# Patient Record
Sex: FEMALE | Race: WHITE | NOT HISPANIC OR LATINO | Employment: FULL TIME | URBAN - METROPOLITAN AREA
[De-identification: names, ages, dates, MRNs, and addresses within clinical notes are randomized per-mention and may not be internally consistent; named-entity substitution may affect disease eponyms.]

---

## 2022-10-14 RX ORDER — MELOXICAM 15 MG/1
15 TABLET ORAL DAILY
COMMUNITY

## 2022-10-14 RX ORDER — BUPROPION HYDROCHLORIDE 300 MG/1
300 TABLET ORAL DAILY
COMMUNITY

## 2022-10-14 RX ORDER — DESOGESTREL AND ETHINYL ESTRADIOL 0.15-0.03
1 KIT ORAL DAILY
COMMUNITY
End: 2022-11-16

## 2022-10-14 RX ORDER — LANOLIN ALCOHOL/MO/W.PET/CERES
1000 CREAM (GRAM) TOPICAL DAILY
COMMUNITY

## 2022-10-14 RX ORDER — FLUOXETINE HYDROCHLORIDE 40 MG/1
40 CAPSULE ORAL DAILY
COMMUNITY

## 2022-10-26 ENCOUNTER — DOCUMENTATION (OUTPATIENT)
Dept: BARIATRICS/WEIGHT MGMT | Facility: CLINIC | Age: 32
End: 2022-10-26

## 2022-10-26 ENCOUNTER — OFFICE VISIT (OUTPATIENT)
Dept: BEHAVIORAL HEALTH | Facility: CLINIC | Age: 32
End: 2022-10-26

## 2022-10-26 ENCOUNTER — OFFICE VISIT (OUTPATIENT)
Dept: BARIATRICS/WEIGHT MGMT | Facility: CLINIC | Age: 32
End: 2022-10-26

## 2022-10-26 VITALS
HEIGHT: 64 IN | TEMPERATURE: 97 F | OXYGEN SATURATION: 98 % | RESPIRATION RATE: 16 BRPM | WEIGHT: 293 LBS | HEART RATE: 81 BPM | DIASTOLIC BLOOD PRESSURE: 84 MMHG | SYSTOLIC BLOOD PRESSURE: 128 MMHG | BODY MASS INDEX: 50.02 KG/M2

## 2022-10-26 DIAGNOSIS — R12 HEARTBURN: ICD-10-CM

## 2022-10-26 DIAGNOSIS — E66.01 MORBID OBESITY WITH BMI OF 50.0-59.9, ADULT: ICD-10-CM

## 2022-10-26 DIAGNOSIS — I10 HYPERTENSION, UNSPECIFIED TYPE: ICD-10-CM

## 2022-10-26 DIAGNOSIS — Z71.89 ENCOUNTER FOR PSYCHOLOGICAL ASSESSMENT PRIOR TO BARIATRIC SURGERY: ICD-10-CM

## 2022-10-26 DIAGNOSIS — R06.09 DYSPNEA ON EXERTION: ICD-10-CM

## 2022-10-26 DIAGNOSIS — Z86.19 HISTORY OF HELICOBACTER PYLORI INFECTION: Primary | ICD-10-CM

## 2022-10-26 DIAGNOSIS — R53.83 FATIGUE, UNSPECIFIED TYPE: ICD-10-CM

## 2022-10-26 DIAGNOSIS — E66.01 MORBID OBESITY WITH BMI OF 50.0-59.9, ADULT: Primary | ICD-10-CM

## 2022-10-26 DIAGNOSIS — R60.0 LEG EDEMA: ICD-10-CM

## 2022-10-26 DIAGNOSIS — F32.9 CURRENT EPISODE OF MAJOR DEPRESSIVE DISORDER WITHOUT PRIOR EPISODE, UNSPECIFIED DEPRESSION EPISODE SEVERITY: ICD-10-CM

## 2022-10-26 PROCEDURE — 99204 OFFICE O/P NEW MOD 45 MIN: CPT | Performed by: PHYSICIAN ASSISTANT

## 2022-10-26 PROCEDURE — 90791 PSYCH DIAGNOSTIC EVALUATION: CPT | Performed by: PSYCHOLOGIST

## 2022-10-26 RX ORDER — LISINOPRIL 5 MG/1
5 TABLET ORAL DAILY
COMMUNITY
Start: 2022-09-19

## 2022-10-26 NOTE — PROGRESS NOTES
"[facility] BARIATRIC SURGERY  [address]  [address]  [facility] [address]  [phone]      Patient  Name:  Faina Valles  :  1990      Date of Visit: 10/26/2022      Chief Complaint:  weight gain; unable to maintain weight loss      History of Present Illness:  Faina Valles is a 32 y.o. female who presents today for evaluation, education and consultation regarding metabolic and bariatric surgery with Dr. Mahmood.     Faina has been overweight for at least 20 years, has been 35 pounds or more overweight for at least 15 years, has been 100 pounds or more overweight for 10 or more years and started dieting at age 14.  Previous diet attempts include: Low Carbohydrate, Calorie Counting and Fasting; Weight Watchers; Ionamin/Adipex.  The most weight Faina lost was 45 pounds but was unable to maintain that weight loss.  Her maximum lifetime weight is 331 pounds.      GI: Chronic, episodic heartburn.  Takes over-the-counter antacids as needed.  Reports EGD in  at [facility].  She has had a history of H. pylori detected in , underwent treatment, and had a negative retest.  She had a laparoscopic cholecystectomy in  at [facility] due to cholelithiasis and had acute pancreatitis prior to her surgery.  She has fatty liver disease and has been evaluated by gastroenterology.    Other past medical history includes hypertension, paresthesias in bilateral hands undergoing current work-up, joint pain (takes Mobic daily), anxiety/depression, and PCOS.     Complete history has been obtained and discussed today, as pertinent to metabolic/ bariatric surgery.     Past Medical History:   Diagnosis Date   • Anxiety    • Arthritis    • Asthma     \"childhood\" no inhaler use   • Depression    • Fatty liver disease, nonalcoholic    • GERD (gastroesophageal reflux disease)    • Glaucoma    • Heartburn     episodic; will take antacids; EGD in  [facility] " hospital;   • History of Helicobacter pylori infection     treated; 2021; retest negative   • Hx of Acute pancreatitis     due to gallstones   • Hypertension    • IBS (irritable bowel syndrome)    • Joint pain     feet/hands; works as hairdresser; Mobic daily   • Leg edema    • Neuromuscular disorder (HCC)    • Paresthesias     b/l hands   • PCOS (polycystic ovarian syndrome)      Past Surgical History:   Procedure Laterality Date   • COLONOSCOPY  2021   • LAPAROSCOPIC CHOLECYSTECTOMY  2021    [facility]; cholelithiasis   • TONSILLECTOMY  2004       Allergies   Allergen Reactions   • Cipro [Ciprofloxacin Hcl] Rash       Current Outpatient Medications:   •  buPROPion XL (WELLBUTRIN XL) 300 MG 24 hr tablet, Take 1 tablet by mouth Daily., Disp: , Rfl:   •  desogestrel-ethinyl estradiol (APRI) 0.15-30 MG-MCG per tablet, Take 1 tablet by mouth Daily., Disp: , Rfl:   •  FLUoxetine (PROzac) 40 MG capsule, Take 1 capsule by mouth Daily., Disp: , Rfl:   •  lisinopril (PRINIVIL,ZESTRIL) 5 MG tablet, Take 1 tablet by mouth Daily., Disp: , Rfl:   •  meloxicam (MOBIC) 15 MG tablet, Take 1 tablet by mouth Daily., Disp: , Rfl:   •  vitamin B-12 (CYANOCOBALAMIN) 1000 MCG tablet, Take 1 tablet by mouth Daily., Disp: , Rfl:     Social History     Socioeconomic History   • Marital status: Single   Tobacco Use   • Smoking status: Never   • Smokeless tobacco: Never   Vaping Use   • Vaping Use: Never used   Substance and Sexual Activity   • Alcohol use: Never   • Drug use: Never   • Sexual activity: Yes     Partners: Male     Birth control/protection: Pill, Same-sex partner     Social History     Social History Narrative    Pt lives in [facility], she is single with no children. She currently works full time at [employer].        Family History   Problem Relation Age of Onset   • Obesity Mother    • Diabetes Mother    • Hypertension Mother    • Sleep apnea Mother    • Arthritis Mother    • Asthma Mother    • COPD  Mother    • Obesity Father    • Diabetes Father    • Hypertension Father    • Stroke Father 50   • Heart attack Father 36   • Heart disease Father    • Sleep apnea Father    • Asthma Father    • COPD Father    • Drug abuse Father    • Obesity Sister    • Hypertension Brother    • Drug abuse Brother    • Obesity Maternal Grandmother    • Diabetes Maternal Grandmother    • Hypertension Maternal Grandmother    • Hypertension Maternal Grandfather    • Stroke Maternal Grandfather 61   • Throat cancer Maternal Grandfather 54   • Cancer Maternal Grandfather    • Obesity Paternal Grandmother    • Diabetes Paternal Grandmother    • Hypertension Paternal Grandmother    • Breast cancer Paternal Grandmother 63   • Cancer Paternal Grandmother    • Miscarriages / Stillbirths Maternal Aunt        Review of Systems:  Constitutional:  reports fatigue, weight gain and denies fevers, chills.  HEENT:  denies headache, ear pain or loss of hearing, blurred or double vision, nasal discharge or sore throat.  Cardiovascular:  reports HTN and denies HLD, CAD, Atrial Fib, hx heart disease, heart murmur, hx MI, chest pain, palpitations, edema, hx DVT.  Respiratory:  reports asthma and denies dyspnea on exertion, shortness of breath , cough , wheezing, sleep apnea, COPD, hx PE.  Gastrointestinal:  reports heartburn, IBS, gallbladder issues, hx pancreatitis and denies dysphagia, nausea, vomiting, diarrhea, constipation, melena, blood in stool.  Genitourinary:  reports none and denies history of  frequent UTI, incontinence, hematuria, dysuria, polyuria, polydipsia, renal insufficiency, renal failure.    Musculoskeletal:  reports joint pain and denies fibromyalgia, arthritis and autoimmune disease.  Neurological:  reports numbness /tingling and denies headaches, migraines, dizziness, confusion, seizure, stroke.  Psychiatric:  reports hx depression, hx anxiety and denies depressed mood, feeling anxious, bipolar disorder, suicidal ideation, hx  suicide attempt, hx self injury, hx substance abuse, eating disorder.  Endocrine:  reports PCOS and denies endometriosis, glucose intolerance, diabetes, thyroid disease, gout.  Hematologic:  reports none and denies bruising, bleeding disorder, hx anemia, hx blood transfusion.  Skin:  reports none and denies rashes, hx MRSA.    Physical Exam:  Vital Signs:  Weight: 134 kg (294 lb 8 oz)   Body mass index is 51.35 kg/m².  Temp: 97 °F (36.1 °C)   Heart Rate: 81   BP: 128/84     Physical Exam  Constitutional:       Appearance: She is obese.   HENT:      Head: Normocephalic and atraumatic.   Eyes:      Extraocular Movements: Extraocular movements intact.      Conjunctiva/sclera: Conjunctivae normal.   Cardiovascular:      Rate and Rhythm: Normal rate and regular rhythm.   Pulmonary:      Effort: Pulmonary effort is normal.      Breath sounds: Normal breath sounds.   Abdominal:      General: Bowel sounds are normal. There is no distension.      Palpations: Abdomen is soft. There is no mass.      Tenderness: There is no abdominal tenderness.      Comments: Old lap scars   Musculoskeletal:         General: Normal range of motion.      Cervical back: Normal range of motion and neck supple.   Skin:     General: Skin is warm and dry.   Neurological:      General: No focal deficit present.      Mental Status: She is alert and oriented to person, place, and time.   Psychiatric:         Mood and Affect: Mood normal.         Behavior: Behavior normal.         Thought Content: Thought content normal.         Judgment: Judgment normal.         Patient Active Problem List   Diagnosis   • Hypertension   • Leg edema   • Heartburn   • History of Helicobacter pylori infection       Assessment:  32 y.o. female with medically complicated obesity pursuing sleeve gastrectomy.    Metabolic & Bariatric Surgery is deemed medically necessary given the following:  Class 3 Severe Obesity (BMI >=40). Obesity-related health conditions include the  following: hypertension, GERD and PCOS, Fatigue . Obesity is worsening. BMI is is above average; BMI management plan is completed. We discussed consulting a Bariatric surgeon.      Plan:  Further evaluation will include: CBC, CMP, Lipids, TSH, HgA1C, H.Pylori UBT, EKG, CXR and Pulmonary Function Testing.    Additional clearances needed prior to surgery will include: Cardiology.     Patient understands that bariatric surgery is not cosmetic surgery but rather a tool to help make a lifelong commitment to lifestyle changes including diet, exercise and behavior modifications.  The patient has been educated today on those expected postoperative lifestyle changes.  Psychological and Nutritional consultations will be completed prior to surgery.  Instructions on how to access [employer] (an internet based site w/ educational surgical videos) were given to the patient.  Recommended perioperative vitamin supplementation was reviewed.  The importance of avoiding ASA/ NSAIDS/ steroids/ tobacco/nicotine/ hormones/ immunomodulators perioperatively was discussed in detail.  All questions/concerns have been addressed.      Further input to follow pending the above.           HIMA Donaldson

## 2022-10-26 NOTE — PROGRESS NOTES
Bariatric Nutrition Consult     Name: Faina Valles   : 1990   AGE: 32 y.o.   MRN: [id number]      Consult Date: 10/26/2022     Surgery desired: sleeve    Height: 161.3cm                  Current weight: 294lbs                    BMI: 51.35    Highest weight: 325lbs                           Lowest weight: 280lbs    Goals: 199lbs        No past medical history on file.                              Diet history reveals eating less recently due to a break up  24 hr recall:  Breakfast: banana and 16oz mt dew  Lunch: 1 little shawanda brownie, 16oz mt dew  Dinner: taco salad-doritos, meat, cheese, lettuce, sauce  Snacks: gummies (pack)    Protein sources: beef, cheese, eggs, shrimp, cooked chicken, pork    Drinks: reduced soda from 8 a day to 4 a day. Has omitted in the past and switched to water, willing to do this again      Food allergies/intolerances: ? Lactose intolerance, texture issues with milk/yogurt/creamy mushy foods    Night eating: no    Patient has/has not been diagnosed with an eating disorder: no    Exercise/activity: no    Main bariatric nutrition principles discussed and explained. Patient needs to focus on 100g protein daily, 100-140g carbohydrates daily, healthy fat intake, 64 oz fluid daily, no carbonation, and try protein drinks/protein powders. Avoid high fructose corn syrup. Patient verbalized understanding and queries were answered.  Additional nutritional counseling will be available      Ekta Acosta RD,BOB

## 2022-10-26 NOTE — PROGRESS NOTES
PROGRESS NOTE    Data:    Faina Delatorre is a 32 y.o. female who met with the undersigned for a scheduled psychological evaluation from 2:45 - 3:20pm.      Clinical Maneuvering/Intervention:      Chief complaint and history of presenting illness/Problems: struggling with obesity for several years. Despite trying different weight loss plans and diets, the pt reported being unsuccessful in losing weight. A psychological evaluation was conducted in order to assess past and current level of functioning. Areas assessed included, but were not limited to: perception of social support, perception of ability to face and deal with challenges in life (positive functioning), anxiety symptoms, depressive symptoms, perspective on beliefs/belief system, coping skills for stress, intelligence level, addiction issues, etc. Therapeutic rapport was established. Interventions conducted today were geared towards assessing the pt's readiness for weight loss surgery and identifying and psychological contraindications for undergoing such a major life change. Social support was deemed strong (specific to weight loss surgery/weight loss in this manner and in a general sense): mother, friends, co-workers, clients (she is a ). Current psychological struggles were described as low, but included: depression situational to being overweight. Coping skills for distress and related to undergoing a major life change such as weight loss surgery/weight loss were deemed strong and included asking for help, thoughtful, good sense of humor, follows directions well, responsible person, maintains quality relationships with others, and believes in herself that she will be successful with weight loss surgery. The pt endorsed having characteristics of readiness to undergo major life changes inherent in the journey of weight loss surgery. She could speak to having 'suffered enough,' and the decision to have weight loss surgery has 'clicked' for  her. The pt expressed gratitude for today's visit.     Past Family and Social History:      History of family mental health problems: none endorsed    Psychosocial history: treatment of psychiatric care in the past (N/A), alcohol/substance abuse treatment in the past (N/A) , alcohol/substance abuse problems (N/A), inpatient psychiatric care (N/A).    Mental Status Exam (MSE):  Hygiene:  good  Dress: normal  Attitude:  cooperative and proactive  Motor Activity: normal  Speech: normal  Mood:   nervous and excited  Affect:  congruent  Thought Processes: normal  Thought Content:  normal  Suicidal Thoughts:  not endorsed  Homicidal Thoughts:  not endorsed  Crisis Safety Plan: not needed   Hallucinations:  none      Patient's Support Network Includes:  family, friends      Progress toward goal: there is evidence to suggest that she is taking measures to improve the quality of her life including seeking weight loss surgery.       Functional Status: moderate to high      Prognosis: good with weight loss surgery    Evaluation, Diagnoses, and Ability/Capacity to Respond to Treatment:      The pt presented to be struggling with depression related to obesity (BMI = 51.35, morbid obesity) and depressed in life in general. Results of MSE demonstrated a functional status of moderate to high. Strengths: belief in self that she will be successful with weight loss surgery, etc (see detailed list of coping skills above). Needed for growth (CPT code requirement for Weaknesses): weight loss.      From a psychological standpoint, the pt presents as a good candidate for bariatric surgery. She is motivated for the surgery, has showed readiness for the lifestyle change in terms of starting to adjust her eating habits, and seems to have appropriate expectations of how to prepare and how to live after surgery in order to lose weight successfully.    Treatment Plan:      Short term goals: Start improving her health by following up with her  bariatric surgeon in order to receive weight loss surgery as soon as feasible/appropriate and demonstrate success with compliance to adhering to the recommended diet. Long term goals: reach a healthy weight and remittance of depression via taking control over her health.    Lidia Rea, PhD, LP

## 2022-10-27 LAB
ALBUMIN SERPL-MCNC: 4.1 G/DL (ref 3.9–5)
ALBUMIN/GLOB SERPL: 1.5 {RATIO} (ref 1.2–2.2)
ALP SERPL-CCNC: 75 IU/L (ref 44–121)
ALT SERPL-CCNC: 22 IU/L (ref 0–32)
AST SERPL-CCNC: 10 IU/L (ref 0–40)
BASOPHILS # BLD AUTO: 0.1 X10E3/UL (ref 0–0.2)
BASOPHILS NFR BLD AUTO: 1 %
BILIRUB SERPL-MCNC: 0.2 MG/DL (ref 0–1.2)
BUN SERPL-MCNC: 7 MG/DL (ref 6–20)
BUN/CREAT SERPL: 8 (ref 9–23)
CALCIUM SERPL-MCNC: 9.5 MG/DL (ref 8.7–10.2)
CHLORIDE SERPL-SCNC: 106 MMOL/L (ref 96–106)
CHOLEST SERPL-MCNC: 133 MG/DL (ref 100–199)
CO2 SERPL-SCNC: 22 MMOL/L (ref 20–29)
CREAT SERPL-MCNC: 0.83 MG/DL (ref 0.57–1)
EGFRCR SERPLBLD CKD-EPI 2021: 97 ML/MIN/1.73
EOSINOPHIL # BLD AUTO: 0 X10E3/UL (ref 0–0.4)
EOSINOPHIL NFR BLD AUTO: 1 %
ERYTHROCYTE [DISTWIDTH] IN BLOOD BY AUTOMATED COUNT: 13.1 % (ref 11.7–15.4)
GLOBULIN SER CALC-MCNC: 2.8 G/DL (ref 1.5–4.5)
GLUCOSE SERPL-MCNC: 84 MG/DL (ref 70–99)
HBA1C MFR BLD: 5.2 % (ref 4.8–5.6)
HCT VFR BLD AUTO: 40.8 % (ref 34–46.6)
HDLC SERPL-MCNC: 48 MG/DL
HGB BLD-MCNC: 13.3 G/DL (ref 11.1–15.9)
IMM GRANULOCYTES # BLD AUTO: 0 X10E3/UL (ref 0–0.1)
IMM GRANULOCYTES NFR BLD AUTO: 0 %
LDLC SERPL CALC-MCNC: 68 MG/DL (ref 0–99)
LYMPHOCYTES # BLD AUTO: 2.4 X10E3/UL (ref 0.7–3.1)
LYMPHOCYTES NFR BLD AUTO: 35 %
MCH RBC QN AUTO: 27.9 PG (ref 26.6–33)
MCHC RBC AUTO-ENTMCNC: 32.6 G/DL (ref 31.5–35.7)
MCV RBC AUTO: 86 FL (ref 79–97)
MONOCYTES # BLD AUTO: 0.4 X10E3/UL (ref 0.1–0.9)
MONOCYTES NFR BLD AUTO: 6 %
NEUTROPHILS # BLD AUTO: 3.9 X10E3/UL (ref 1.4–7)
NEUTROPHILS NFR BLD AUTO: 57 %
PLATELET # BLD AUTO: 343 X10E3/UL (ref 150–450)
POTASSIUM SERPL-SCNC: 4.4 MMOL/L (ref 3.5–5.2)
PROT SERPL-MCNC: 6.9 G/DL (ref 6–8.5)
RBC # BLD AUTO: 4.76 X10E6/UL (ref 3.77–5.28)
SODIUM SERPL-SCNC: 140 MMOL/L (ref 134–144)
TRIGL SERPL-MCNC: 87 MG/DL (ref 0–149)
TSH SERPL DL<=0.005 MIU/L-ACNC: 0.59 UIU/ML (ref 0.45–4.5)
UREA BREATH TEST QL: NEGATIVE
VLDLC SERPL CALC-MCNC: 17 MG/DL (ref 5–40)
WBC # BLD AUTO: 6.8 X10E3/UL (ref 3.4–10.8)

## 2022-11-09 NOTE — PROGRESS NOTES
[facility] Cardiology  [address], [address]  [place], [place], [id number] [phone]  [email or web address]           OUTPATIENT CLINIC CONSULTATION NOTE    Patient care team:  Patient Care Team:  Anabel Whitehead DO as PCP - General (Internal Medicine)    Requesting Provider and Reason for consultation: The patient is being seen today at the request of HIMA Donaldson for hypertension, preoperative cardiac risk assessment.     Subjective:   Chief complaint:   Chief Complaint   Patient presents with   • Hypertension   • Leg Swelling   • Surgical Clearance     Gastric sleeve       HPI:    Faina Valles is a 32 y.o. female.  Cardiac focused problem list:  1. Hypertension   2. Heartburn   3. History of H. Pylori infection   a. EGD 2021  b. Negative for H. Pylori status post treatment  4. Morbid obesity   5. PCOS  6. Anxiety   7. Depression     Patient presents today for consultation and preoperative cardiac risk assessment.  She is undergoing evaluation for bariatric surgery.  Denies tobacco, EtOH or drug use.  Does have a family history of coronary artery disease in her father who has had MIs x3 and cardiac stents.  His first MI was in his 30s and he is a heavy smoker, meth user and diabetic.    Patient has not had any prior cardiac testing.  No regular forms of exercise.  Has some dyspnea on exertion and mild lower extremity edema.  Denies chest pain, palpitations, lightheadedness or syncope.  Is able to perform household chores, grocery shopping and activities of daily living without difficulties.    Review of Systems:  As noted above in the HPI    PFSH:  Patient Active Problem List   Diagnosis   • Hypertension   • Leg edema   • Heartburn   • History of Helicobacter pylori infection         Current Outpatient Medications:   •  buPROPion XL (WELLBUTRIN XL) 300 MG 24 hr tablet, Take 1 tablet by mouth Daily., Disp: , Rfl:   •  FLUoxetine (PROzac) 40 MG capsule, Take 1 capsule by  mouth Daily., Disp: , Rfl:   •  lisinopril (PRINIVIL,ZESTRIL) 5 MG tablet, Take 1 tablet by mouth Daily., Disp: , Rfl:   •  meloxicam (MOBIC) 15 MG tablet, Take 1 tablet by mouth Daily., Disp: , Rfl:   •  Nextstellis 3-14.2 MG tablet, Take 1 tablet by mouth Daily., Disp: , Rfl:   •  vitamin B-12 (CYANOCOBALAMIN) 1000 MCG tablet, Take 1 tablet by mouth Daily., Disp: , Rfl:     Allergies   Allergen Reactions   • Cipro [Ciprofloxacin Hcl] Rash       Social History     Socioeconomic History   • Marital status: Single   Tobacco Use   • Smoking status: Never   • Smokeless tobacco: Never   Vaping Use   • Vaping Use: Never used   Substance and Sexual Activity   • Alcohol use: Never   • Drug use: Never   • Sexual activity: Yes     Partners: Male     Birth control/protection: Pill, Same-sex partner     Family History   Problem Relation Age of Onset   • Obesity Mother    • Diabetes Mother    • Hypertension Mother    • Sleep apnea Mother    • Arthritis Mother    • Asthma Mother    • COPD Mother    • Obesity Father    • Diabetes Father    • Hypertension Father    • Stroke Father 50   • Heart attack Father 36   • Heart disease Father    • Sleep apnea Father    • Asthma Father    • COPD Father    • Drug abuse Father    • Obesity Sister    • No Known Problems Sister    • Hypertension Brother    • Drug abuse Brother    • No Known Problems Brother    • Miscarriages / Stillbirths Maternal Aunt    • Obesity Maternal Grandmother    • Diabetes Maternal Grandmother    • Hypertension Maternal Grandmother    • Hypertension Maternal Grandfather    • Stroke Maternal Grandfather 61   • Throat cancer Maternal Grandfather 54   • Cancer Maternal Grandfather    • Obesity Paternal Grandmother    • Diabetes Paternal Grandmother    • Hypertension Paternal Grandmother    • Breast cancer Paternal Grandmother 63   • Cancer Paternal Grandmother          Objective:   Physical Exam:  /64 (BP Location: Right arm, Patient Position: Sitting)   Pulse 77  "  Ht 165.1 cm (65\")   Wt 132 kg (291 lb)   SpO2 98%   BMI 48.42 kg/m²   CONSTITUTIONAL: No acute distress  RESPIRATORY: Normal effort. Clear to auscultation bilaterally without wheezing or rales  CARDIOVASCULAR: Regular rate and rhythm with normal S1 and S2. Without murmur.  PERIPHERAL VASCULAR: Normal radial pulse. There is no lower extremity edema bilaterally.      Labs:  Labs reviewed by myself  BUN   Date Value Ref Range Status   10/26/2022 7 6 - 20 mg/dL Final     Creatinine   Date Value Ref Range Status   10/26/2022 0.83 0.57 - 1.00 mg/dL Final     Potassium   Date Value Ref Range Status   10/26/2022 4.4 3.5 - 5.2 mmol/L Final     ALT (SGPT)   Date Value Ref Range Status   10/26/2022 22 0 - 32 IU/L Final     AST (SGOT)   Date Value Ref Range Status   10/26/2022 10 0 - 40 IU/L Final       No results found for: CHOL  Lab Results   Component Value Date    TRIG 87 10/26/2022     Lab Results   Component Value Date    HDL 48 10/26/2022     Lab Results   Component Value Date    LDL 68 10/26/2022     No components found for: LDLDIRECTC    Diagnostic Data:      ECG 12 Lead    Date/Time: 11/16/2022 4:05 PM  Performed by: Baljeet Marquez MD  Authorized by: Baljeet Marquez MD   Comparison: not compared with previous ECG   Previous ECG: no previous ECG available  Rhythm: sinus rhythm  Rate: normal  BPM: 73  Conduction: conduction normal                  Assessment and Plan:     Primary hypertension  -Blood pressure stable.   -Continue current related medication.     Preoperative cardiovascular examination   -Does have some risk factors for cardiac disease.  However she is active without cardiac symptoms.  -Regularly performing greater than 4 metabolic equivalents without cardiopulmonary symptoms.  -Okay to proceed with bariatric surgery from a cardiac standpoint.  No additional cardiac testing recommended at this time.    - Return if symptoms worsen or fail to improve.    Scribed for Baljeet Marquez MD by Pepper Al, " APRN. 11/16/2022  16:05 EST    I, Baljeet Marquez MD, personally performed the services as scribed by the above named individual. I have made any necessary edits and it is both accurate and complete.     Baljeet Marquez MD, MSc, FACC, [facility]  Interventional Cardiology  [facility]

## 2022-11-16 ENCOUNTER — OFFICE VISIT (OUTPATIENT)
Dept: CARDIOLOGY | Facility: CLINIC | Age: 32
End: 2022-11-16

## 2022-11-16 VITALS
SYSTOLIC BLOOD PRESSURE: 112 MMHG | OXYGEN SATURATION: 98 % | WEIGHT: 291 LBS | BODY MASS INDEX: 48.48 KG/M2 | HEIGHT: 65 IN | DIASTOLIC BLOOD PRESSURE: 64 MMHG | HEART RATE: 77 BPM

## 2022-11-16 DIAGNOSIS — I10 PRIMARY HYPERTENSION: Primary | ICD-10-CM

## 2022-11-16 DIAGNOSIS — Z01.810 PREOPERATIVE CARDIOVASCULAR EXAMINATION: ICD-10-CM

## 2022-11-16 PROCEDURE — 93000 ELECTROCARDIOGRAM COMPLETE: CPT | Performed by: INTERNAL MEDICINE

## 2022-11-16 PROCEDURE — 99203 OFFICE O/P NEW LOW 30 MIN: CPT | Performed by: INTERNAL MEDICINE

## 2022-11-16 RX ORDER — DROSPIRENONE AND ESTETROL 3-14.2(28)
1 KIT ORAL DAILY
COMMUNITY
Start: 2022-11-02

## 2024-01-17 ENCOUNTER — TELEPHONE (OUTPATIENT)
Dept: CARDIOLOGY | Facility: CLINIC | Age: 34
End: 2024-01-17
Payer: COMMERCIAL

## 2024-01-17 ENCOUNTER — OFFICE VISIT (OUTPATIENT)
Dept: CARDIOLOGY | Facility: CLINIC | Age: 34
End: 2024-01-17
Payer: COMMERCIAL

## 2024-01-17 VITALS
WEIGHT: 293 LBS | HEART RATE: 85 BPM | DIASTOLIC BLOOD PRESSURE: 74 MMHG | OXYGEN SATURATION: 98 % | BODY MASS INDEX: 50.02 KG/M2 | HEIGHT: 64 IN | SYSTOLIC BLOOD PRESSURE: 142 MMHG

## 2024-01-17 DIAGNOSIS — Z01.818 PREOPERATIVE CLEARANCE: Primary | ICD-10-CM

## 2024-01-17 DIAGNOSIS — I10 PRIMARY HYPERTENSION: ICD-10-CM

## 2024-01-17 DIAGNOSIS — R01.1 CARDIAC MURMUR: ICD-10-CM

## 2024-01-17 NOTE — TELEPHONE ENCOUNTER
Received cardiac clearance request from  stating pt has weight loss surgery to be scheduled and is requiring a cardiac clearance. Placed cardiac clearance request in [facility]'s inbox to review and address with provider.

## 2024-01-17 NOTE — PROGRESS NOTES
"Ana Valles is a 33 y.o. female who presents today for Establish Care (Cardiac Eval. /Cardiac Clearance for bariatric surgery ).    CHIEF COMPLIANT  Chief Complaint   Patient presents with    Establish Care     Cardiac Eval.   Cardiac Clearance for bariatric surgery        Active Problems:  Hypertension  Clearance for bariatric surgery   3. Cardiac murmur     HPI  The patient is a 33 year old female that was referred to obtain cardiac clearance prior to bariatric surgery.  She will be undergoing gastric sleeve surgery in the next few months.  She denies chest pain, shortness of breath, syncope, near syncope, palpitations.  She is on a low dose of lisinopril due to hypertension.  She does have a family history of CAD, her father has a h/o MI and stenting in his late 30's to early 40s.  Her EKG shows NSR, no acute ST changes noted.  She does have a one year old and is able to do most activities without limitation.      PRIOR MEDS  Current Outpatient Medications on File Prior to Visit   Medication Sig Dispense Refill    buPROPion XL (WELLBUTRIN XL) 300 MG 24 hr tablet Take 1 tablet by mouth Daily.      cholecalciferol (VITAMIN D3) 1.25 MG (94783 UT) capsule Take 1 capsule every week by oral route.      lisinopril (PRINIVIL,ZESTRIL) 5 MG tablet Take 1 tablet by mouth Daily.      meloxicam (MOBIC) 15 MG tablet Take 1 tablet by mouth Daily.      Nextstellis 3-14.2 MG tablet Take 1 tablet by mouth Daily.      vitamin B-12 (CYANOCOBALAMIN) 1000 MCG tablet Take 1 tablet by mouth Daily.      [DISCONTINUED] FLUoxetine (PROzac) 40 MG capsule Take 1 capsule by mouth Daily.       No current facility-administered medications on file prior to visit.       ALLERGIES  Cipro [ciprofloxacin hcl]    HISTORY  Past Medical History:   Diagnosis Date    Anxiety     Arthritis     Asthma     \"childhood\" no inhaler use    Depression     Fatty liver disease, nonalcoholic     GERD (gastroesophageal reflux disease)     Glaucoma  "    Heartburn     episodic; will take antacids; EGD in 2021 [facility];    History of Helicobacter pylori infection     treated; 2021; retest negative    Hx of Acute pancreatitis     due to gallstones    Hypertension     IBS (irritable bowel syndrome)     Joint pain     feet/hands; works as hairdresser; Mobic daily    Leg edema     Neuromuscular disorder     Paresthesias     b/l hands    PCOS (polycystic ovarian syndrome)        Social History     Socioeconomic History    Marital status: Single   Tobacco Use    Smoking status: Never    Smokeless tobacco: Never   Vaping Use    Vaping Use: Never used   Substance and Sexual Activity    Alcohol use: Never    Drug use: Never    Sexual activity: Yes     Partners: Male     Birth control/protection: Birth control pill, Same-sex partner, Pill       Family History   Problem Relation Age of Onset    Obesity Mother     Diabetes Mother     Hypertension Mother     Sleep apnea Mother     Arthritis Mother     Asthma Mother     COPD Mother     Obesity Father     Diabetes Father     Hypertension Father     Stroke Father 50    Heart attack Father 36    Heart disease Father     Sleep apnea Father     Asthma Father     COPD Father     Drug abuse Father     Obesity Sister     No Known Problems Sister     Hypertension Brother     Drug abuse Brother     No Known Problems Brother     Miscarriages / Stillbirths Maternal Aunt     Obesity Maternal Grandmother     Diabetes Maternal Grandmother     Hypertension Maternal Grandmother     Hypertension Maternal Grandfather     Stroke Maternal Grandfather 61    Throat cancer Maternal Grandfather 54    Cancer Maternal Grandfather     Obesity Paternal Grandmother     Diabetes Paternal Grandmother     Hypertension Paternal Grandmother     Breast cancer Paternal Grandmother 63    Cancer Paternal Grandmother        Review of Systems   Constitutional:  Negative for chills, diaphoresis, fatigue (no more than normal) and fever.   HENT: Negative.    "  Eyes: Negative.  Negative for visual disturbance.   Respiratory: Negative.  Negative for apnea, cough, chest tightness, shortness of breath and wheezing.    Cardiovascular: Negative.  Negative for chest pain, palpitations and leg swelling.   Gastrointestinal: Negative.  Negative for blood in stool.   Endocrine: Negative.  Negative for cold intolerance and heat intolerance.   Genitourinary: Negative.  Negative for hematuria.   Musculoskeletal: Negative.  Negative for arthralgias, back pain, myalgias, neck pain and neck stiffness.   Skin: Negative.  Negative for color change, rash and wound.   Allergic/Immunologic: Negative.  Negative for environmental allergies and food allergies.   Neurological: Negative.  Negative for dizziness, syncope, weakness, light-headedness, numbness and headaches.   Hematological: Negative.  Does not bruise/bleed easily.   Psychiatric/Behavioral: Negative.  Negative for sleep disturbance.        Objective     VITALS: /74 (BP Location: Right arm, Patient Position: Sitting)   Pulse 85   Ht 162.6 cm (64\")   Wt (!) 148 kg (327 lb 3.2 oz)   SpO2 98%   BMI 56.16 kg/m²     LABS:   Lab Results (most recent)       None            IMAGING:   No Images in the past 120 days found..    EXAM:  Physical Exam  Constitutional:       Appearance: Normal appearance.   Eyes:      Pupils: Pupils are equal, round, and reactive to light.   Cardiovascular:      Rate and Rhythm: Normal rate and regular rhythm.      Pulses:           Carotid pulses are 2+ on the right side and 2+ on the left side.       Radial pulses are 2+ on the right side and 2+ on the left side.        Dorsalis pedis pulses are 2+ on the right side and 2+ on the left side.        Posterior tibial pulses are 2+ on the right side and 2+ on the left side.      Heart sounds: Murmur (1/6 systolic murmur noted) heard.   Pulmonary:      Effort: Pulmonary effort is normal.      Breath sounds: Normal breath sounds.   Abdominal:      General: " Bowel sounds are normal.      Palpations: Abdomen is soft.   Musculoskeletal:      Right lower leg: No edema.      Left lower leg: No edema.   Skin:     General: Skin is warm and dry.      Capillary Refill: Capillary refill takes less than 2 seconds.   Neurological:      General: No focal deficit present.      Mental Status: She is alert and oriented to person, place, and time.   Psychiatric:         Mood and Affect: Mood normal.         Thought Content: Thought content normal.         Procedure     ECG 12 Lead    Date/Time: 1/17/2024 10:41 AM  Performed by: Henrietta Navarrete APRN    Authorized by: Henrietta Navarrete APRN  Comparison: compared with previous ECG from 11/16/2022  Rhythm: sinus rhythm  Rate: normal  BPM: 76  Conduction: conduction normal  ST Segments: ST segments normal  T Waves: T waves normal  QRS axis: normal  Comments: No acute changes  Qtc 392ms              Assessment & Plan    Diagnosis Plan   1. Preoperative clearance        2. Cardiac murmur  ECG 12 Lead    Adult Transthoracic Echo Complete W/ Cont if Necessary Per Protocol      3. Primary hypertension          Preoperative clearance: The patient denies chest pain, shortness of breath, syncope, near syncope.  EKG shows no acute changes.    Cardiac murmur: The patient was found to have a murmur on assessment today.  Will obtain an echo for evaluate LV function and structural anatomy.    Primary hypertension: Continue lisinopril.  The patient was instructed to monitor BP at home and to notify our office if systolic BP is consistently greater than 130-135 systolic.  Goal BP is 130-135/70-80.  Return in about 2 months (around 3/17/2024).    Patient did not bring med list or medicine bottles to appointment, med list has been reviewed and updated based on patient's knowledge of their meds.          MEDS ORDERED DURING VISIT:  No orders of the defined types were placed in this encounter.      DISCONTINUED MEDS DURING VISIT:   Medications  Discontinued During This Encounter   Medication Reason    FLUoxetine (PROzac) 40 MG capsule Discontinued by another clinician          This document has been electronically signed by CHARISMA Joseph  January 17, 2024 11:12 EST    Dictated Utilizing Dragon Dictation: Part of this note may be an electronic transcription/translation of spoken language to printed text using the Dragon Dictation System

## 2024-01-18 ENCOUNTER — PATIENT ROUNDING (BHMG ONLY) (OUTPATIENT)
Dept: CARDIOLOGY | Facility: CLINIC | Age: 34
End: 2024-01-18
Payer: COMMERCIAL

## 2024-01-18 NOTE — PROGRESS NOTES
January 18, 2024    Hello, may I speak with Faina Valles?    My name is ARMANDO LESLIE      I am  with [facility]  [address] [place] [id number] [phone].    Before we get started may I verify your date of birth? 1990    I am calling to officially welcome you to our practice and ask about your recent visit. Is this a good time to talk? yes    Tell me about your visit with us. What things went well?  EVERYTHING WENT FINE.  SHE THINKS I HAVE A MURMUR       We're always looking for ways to make our patients' experiences even better. Do you have recommendations on ways we may improve?  no    Overall were you satisfied with your first visit to our practice? yes       I appreciate you taking the time to speak with me today. Is there anything else I can do for you? no      Thank you, and have a great day.

## 2024-02-16 ENCOUNTER — HOSPITAL ENCOUNTER (OUTPATIENT)
Dept: CARDIOLOGY | Facility: HOSPITAL | Age: 34
Discharge: HOME | End: 2024-02-16
Payer: COMMERCIAL

## 2024-02-16 DIAGNOSIS — R01.1 CARDIAC MURMUR: ICD-10-CM

## 2024-02-16 LAB
BH CV ECHO MEAS - ACS: 1.82 CM
BH CV ECHO MEAS - AO MAX PG: 11.7 MMHG
BH CV ECHO MEAS - AO MEAN PG: 6.6 MMHG
BH CV ECHO MEAS - AO ROOT DIAM: 2.47 CM
BH CV ECHO MEAS - AO V2 MAX: 171 CM/SEC
BH CV ECHO MEAS - AO V2 VTI: 32.1 CM
BH CV ECHO MEAS - EDV(CUBED): 65 ML
BH CV ECHO MEAS - EDV(MOD-SP4): 125 ML
BH CV ECHO MEAS - EF(MOD-SP4): 57.8 %
BH CV ECHO MEAS - EF_3D-VOL: 60 %
BH CV ECHO MEAS - ESV(CUBED): 9 ML
BH CV ECHO MEAS - ESV(MOD-SP4): 52.8 ML
BH CV ECHO MEAS - FS: 48.3 %
BH CV ECHO MEAS - IVS/LVPW: 0.99 CM
BH CV ECHO MEAS - IVSD: 1.14 CM
BH CV ECHO MEAS - LA DIMENSION: 4 CM
BH CV ECHO MEAS - LAT PEAK E' VEL: 11.7 CM/SEC
BH CV ECHO MEAS - LV DIASTOLIC VOL/BSA (35-75): 51.9 CM2
BH CV ECHO MEAS - LV MASS(C)D: 155.6 GRAMS
BH CV ECHO MEAS - LV SYSTOLIC VOL/BSA (12-30): 21.9 CM2
BH CV ECHO MEAS - LVIDD: 4 CM
BH CV ECHO MEAS - LVIDS: 2.08 CM
BH CV ECHO MEAS - LVPWD: 1.15 CM
BH CV ECHO MEAS - MED PEAK E' VEL: 8.8 CM/SEC
BH CV ECHO MEAS - MV A MAX VEL: 54 CM/SEC
BH CV ECHO MEAS - MV DEC SLOPE: 435.6 CM/SEC2
BH CV ECHO MEAS - MV E MAX VEL: 106 CM/SEC
BH CV ECHO MEAS - MV E/A: 1.96
BH CV ECHO MEAS - RAP SYSTOLE: 10 MMHG
BH CV ECHO MEAS - RVDD: 3.2 CM
BH CV ECHO MEAS - RVSP: 33.9 MMHG
BH CV ECHO MEAS - SI(MOD-SP4): 30 ML/M2
BH CV ECHO MEAS - SV(MOD-SP4): 72.2 ML
BH CV ECHO MEAS - TR MAX PG: 23.9 MMHG
BH CV ECHO MEAS - TR MAX VEL: 244.3 CM/SEC
BH CV ECHO MEASUREMENTS AVERAGE E/E' RATIO: 10.34
BH CV XLRA - RV BASE: 3.5 CM
BH CV XLRA - RV LENGTH: 7.5 CM
BH CV XLRA - RV MID: 2.18 CM
LEFT ATRIUM VOLUME INDEX: 27.6 ML/M2

## 2024-02-16 PROCEDURE — 93306 TTE W/DOPPLER COMPLETE: CPT

## 2024-02-23 ENCOUNTER — TELEPHONE (OUTPATIENT)
Dept: CARDIOLOGY | Facility: CLINIC | Age: 34
End: 2024-02-23
Payer: COMMERCIAL

## 2024-02-23 NOTE — TELEPHONE ENCOUNTER
Attempted to call patient regarding CHARISMA Mata's recommendations.    Faxed results to pcp     Clearance letter faxed to via [employer]. (See Letter)       ----- Message from CHARISMA Cote sent at 2/23/2024 12:07 PM EST -----  Ef 50-55%, very minimal leaking in mitral valve and tricuspid valve.  No significant findings.  Can be cleared for surgery.  I filled out form.        Echo results    Result Text       Left ventricular ejection fraction appears to be 56 - 60%.    Left ventricular diastolic function was normal.    The left atrial cavity is mildly dilated.    Estimated right ventricular systolic pressure from tricuspid regurgitation is normal (<35 mmHg).     Technically adequate study.     1.  LV size, function, wall motion, and wall thickness are normal.  Visually estimated ejection fraction is 50 to 55%.  Normal LV diastolic function and filling pressures.  The left atrium is mildly dilated.  Right heart chambers are grossly normal.  No septal defect or intracavitary mass or thrombus.     2.  Valves are morphologically normal with no stenotic lesions or valve associated masses or thrombi.  There is trivial MR, trivial AI, and trivial to mild TR.     3.  No pericardial or great vessel pathology.     4.  RV and PA systolic pressures are estimated in the low to mid 30s.

## 2024-02-26 NOTE — TELEPHONE ENCOUNTER
Notified pt of CHARISMA Mata's recommendations. Pt verbalizes understanding. Pt is aware that we have faxed clearance letter to surgeon's office, is aware to keep scheduled appt, and to call with any questions or concerns

## 2024-03-01 ENCOUNTER — TELEPHONE (OUTPATIENT)
Dept: CARDIOLOGY | Facility: CLINIC | Age: 34
End: 2024-03-01
Payer: COMMERCIAL

## 2024-03-01 NOTE — TELEPHONE ENCOUNTER
Caller: [facility]    Relationship: Provider    Best call back number: [phone]     What form or medical record are you requesting: EKG     Who is requesting this form or medical record from you: [facility] [place]    How would you like to receive the form or medical records (pick-up, mail, fax): FAX   If fax, what is the fax number: [phone]    Timeframe paperwork needed: END OF DAY 3/1/24

## 2024-03-01 NOTE — TELEPHONE ENCOUNTER
Reviewed pt's chart and located clearance letter for [facility] since Rosita SALDIVAR was unable to reach this pt to verify EKG is needed.  EKG faxed.